# Patient Record
Sex: MALE | Race: WHITE | NOT HISPANIC OR LATINO | Employment: FULL TIME | URBAN - METROPOLITAN AREA
[De-identification: names, ages, dates, MRNs, and addresses within clinical notes are randomized per-mention and may not be internally consistent; named-entity substitution may affect disease eponyms.]

---

## 2019-09-24 ENCOUNTER — APPOINTMENT (OUTPATIENT)
Dept: CT IMAGING | Facility: HOSPITAL | Age: 39
End: 2019-09-24

## 2019-09-24 ENCOUNTER — HOSPITAL ENCOUNTER (EMERGENCY)
Facility: HOSPITAL | Age: 39
Discharge: HOME | End: 2019-09-24
Attending: EMERGENCY MEDICINE | Admitting: EMERGENCY MEDICINE

## 2019-09-24 VITALS
HEART RATE: 59 BPM | WEIGHT: 175 LBS | DIASTOLIC BLOOD PRESSURE: 91 MMHG | RESPIRATION RATE: 16 BRPM | HEIGHT: 69 IN | BODY MASS INDEX: 25.92 KG/M2 | SYSTOLIC BLOOD PRESSURE: 128 MMHG | TEMPERATURE: 97.4 F | OXYGEN SATURATION: 98 %

## 2019-09-24 DIAGNOSIS — S02.839D: Primary | ICD-10-CM

## 2019-09-24 PROCEDURE — 70450 CT HEAD/BRAIN W/O DYE: CPT

## 2019-09-24 PROCEDURE — 70480 CT ORBIT/EAR/FOSSA W/O DYE: CPT

## 2019-09-24 PROCEDURE — 99282 EMERGENCY DEPT VISIT SF MDM: CPT

## 2019-09-24 NOTE — CONSULTS
OPHTHALMOLOGY CONSULT NOTE    Patient Identification:  Name: Hernan Wise  Age: 39 y.o.  Sex: male  :  1980  MRN: [id number]                                               Requesting Physician: per order  Reason for consult: orbital wall fracture    History of Present Illness:  39 y.o. male with no past history presents one day after sustaining a fall and striking the right side of his face on a drill. He was initially fine until he blew his nose and his right eyelids became swollen and e experienced double vision. Today his diplopia and swelling is largely resolved, aside from diplopia in far left gaze. He denies significant pain or vision changes.     Problem List:  Active Problems:    * No active hospital problems. *      Past Medical History:  No past medical history on file.    Past Surgical History:  No past surgical history on file.     Past Ocular History:    ROS:  Pertinent items are noted in HPI    Eyes: negative  Ocular Medication: none    Home Meds:    (Not in a hospital admission)    Current Meds:   No current facility-administered medications for this encounter.   No current outpatient medications on file.    Allergies:  No Known Allergies    Social History:   Social History     Tobacco Use   • Smoking status: Current Every Day Smoker   Substance Use Topics   • Alcohol use: Yes        Family History:  Denies h/o glaucoma, retinal detachment, strabismus, amblyopia    Objective:  General Appearance: NAD    Exam:    VA sc near P T EOM CVF   20/20 5->3mm no apd STP Full Full   20/20 5->3mm no apd STP Full Full     SLE/PLE    With 20D lens at bedside     OD OS   External/Lid Minimal edema wnl   Conj/sclera White/quiet White/quiet   Cornea clear clear   Anterior Chamber formed formed   Iris Round/reactive Round/reactive   Lens clear clear   Vitreous clear clear       Imaging:  CT Head Without Contrast   Preliminary Result   1.  Irregularities consistent with fractures involving the floor of the    orbit and medial orbital wall. There is air present within the orbit,   extraconal but with the majority of the air being periorbital and   superior lateral to the right zygoma. Fracture involving the floor of   the orbit is age indeterminate but may be remote as there is no evidence   of associated fluid in the right maxillary sinus. The medial orbital   wall may be acute as there is also air present within the orbit adjacent   to the fracture.   2.  Large caries involving the most posterior left maxillary molar.       The above information was called to and discussed with Alonzo De La Paz PA-C.                   Radiation dose reduction techniques were utilized, including automated   exposure control and exposure modulation based on body size.              CT Orbits Without Contrast   Preliminary Result   1.  Irregularities consistent with fractures involving the floor of the   orbit and medial orbital wall. There is air present within the orbit,   extraconal but with the majority of the air being periorbital and   superior lateral to the right zygoma. Fracture involving the floor of   the orbit is age indeterminate but may be remote as there is no evidence   of associated fluid in the right maxillary sinus. The medial orbital   wall may be acute as there is also air present within the orbit adjacent   to the fracture.   2.  Large caries involving the most posterior left maxillary molar.       The above information was called to and discussed with Alonzo De La Paz PA-C.                   Radiation dose reduction techniques were utilized, including automated   exposure control and exposure modulation based on body size.                    Assessment/Recommendations:  Hernan Wise is a 39 y.o.     1) right medial orbital wall fracture  - with subcutanous emphysema secondary to nose blowing  - minimal diplopia in far left gaze. No clinical or radiographic evidence of entrapment  - no surgical intervention  - nose blowing  precautions discussed    2) right orbital floor fracture  - possibly old and non displaced  - recommend observation    Patient needs to follow up in 2-3 weeks, call for appointment [phone].    Thank you for the consult.    Miguel Deleon Jr, MD  9/24/2019 1:49 PM

## 2019-09-24 NOTE — ED PROVIDER NOTES
EMERGENCY DEPARTMENT ENCOUNTER    Room number:  21/21  Date Seen:  9/24/2019  Time of transfer: 1100  PCP:  Provider, No Known    HPI  The patient is a 39 y.o. male who presents to the ED c/o a right facial injury that occurred yesterday. The patient reports he had a mechanical fall yesterday while at work at which time he struck the right side of his face on a drill that was on the ground. The patient denies LOC or any other sustaining injuries from the fall. The patient denies any symptoms immediately after the injury but states he noticed mild intermittent double vision of the right eye a few hours after the injury. The patient denies any double vision currently. The patient reports he also blew his nose yesterday night while he was taking a shower and then had a sudden onset of swelling around his right eye. The patient denies epistaxis. The patient reports he went to [facility] earlier today and was referred here for further evaluation. The patient is not currently anticoagulated.     PHYSICAL EXAM  His pupils are 4 mm reactive bilaterally. Conjunctiva is normal. No proptosis bilaterally. No periorbital ecchymosis. EOM. He reports mild intermittent diplopia of the right eye with lateral gaze. His gaze is conjugated.      RADIOLOGY:  CT Head Without Contrast   Preliminary Result   1.  Irregularities consistent with fractures involving the floor of the   orbit and medial orbital wall. There is air present within the orbit,   extraconal but with the majority of the air being periorbital and   superior lateral to the right zygoma. Fracture involving the floor of   the orbit is age indeterminate but may be remote as there is no evidence   of associated fluid in the right maxillary sinus. The medial orbital   wall may be acute as there is also air present within the orbit adjacent   to the fracture.   2.  Large caries involving the most posterior left maxillary molar.       The above information was called to and  discussed with Alonzo De La Paz PA-C.                   Radiation dose reduction techniques were utilized, including automated   exposure control and exposure modulation based on body size.              CT Orbits Without Contrast   Preliminary Result   1.  Irregularities consistent with fractures involving the floor of the   orbit and medial orbital wall. There is air present within the orbit,   extraconal but with the majority of the air being periorbital and   superior lateral to the right zygoma. Fracture involving the floor of   the orbit is age indeterminate but may be remote as there is no evidence   of associated fluid in the right maxillary sinus. The medial orbital   wall may be acute as there is also air present within the orbit adjacent   to the fracture.   2.  Large caries involving the most posterior left maxillary molar.       The above information was called to and discussed with Alonzo De La Paz PA-C.                   Radiation dose reduction techniques were utilized, including automated   exposure control and exposure modulation based on body size.                I reviewed the above results    MEDICATIONS ORDERED IN THE ED:  Medications - No data to display    PROGRESS AND CONSULT NOTES:  1100:  Patient care transferred from Alonzo De La Paz PA-C pending workup results and final disposition.    1147: Placed call to Ophthalmology for consult.    1239:  Received a call from Dr. Miguel Deleon (Ophthalmology) and discussed pt's case. Dr. Deleon stated he will come to the ER for a bedside evaluation.    1335:  Dr. Miguel Deleon (Ophthalmology) has arrived to the ER for a bedside evaluation. Dr. Deleon agrees with the plan to discharge the patient home. He has advised the patient to f/u with him in the office in two weeks for further evaluation and management. Pt understands and agrees with the plan, all questions answered.    DIAGNOSIS:  Final diagnoses:   Closed medial orbital wall fracture with routine healing,  subsequent encounter       DISPOSITION:  DISCHARGE    Patient discharged in stable condition.    Reviewed implications of results, diagnosis, meds, responsibility to follow up, warning signs and symptoms of possible worsening, potential complications and reasons to return to ER, including any new or worsening symptoms.    Patient/Family voiced understanding of above instructions.    Discussed plan for discharge, as there is no emergent indication for admission. Patient referred to primary care provider for BP management due to today's BP. Pt/family is agreeable and understands need for follow up and repeat testing.  Pt is aware that discharge does not mean that nothing is wrong but it indicates no emergency is present that requires admission and they must continue care with follow-up as given below or physician of their choice.     FOLLOW-UP  Miguel Deleon Jr., MD  [address] [place] [address]  [phone]      call for appointment in 2 weeks         Medication List      Stop    tobramycin-dexamethasone 0.3-0.1 % ophthalmic suspension  Commonly known as:  TOBRADEX     valACYclovir 1000 MG tablet  Commonly known as:  VALTREX              Provider attestation:  I personally reviewed the past medical history, past surgical history, social history, family history, current medications, and allergies as they appear in the chart.    Documentation assistance provided by sid Beltran for Dr. Jack MD.  Information recorded by the sid was done at my direction and has been verified and validated by me.     Mickie Beltran  09/24/19 1406       Tahir Santiago MD  09/24/19 2008

## 2019-09-24 NOTE — ED NOTES
Pt arrives from [facility] with c/o a mechanical fall yesterday in which he hit the right side of his face on a drill that was lying on the ground. Pt reports that he woke up this morning with double vision in the right eye and right eye edema. Pt denies NV or LOC. Denies blood thinners.     Breonna Gould RN  09/24/19 2558
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
